# Patient Record
Sex: MALE | Race: WHITE | NOT HISPANIC OR LATINO | URBAN - METROPOLITAN AREA
[De-identification: names, ages, dates, MRNs, and addresses within clinical notes are randomized per-mention and may not be internally consistent; named-entity substitution may affect disease eponyms.]

---

## 2019-10-11 ENCOUNTER — EMERGENCY (EMERGENCY)
Facility: HOSPITAL | Age: 44
LOS: 1 days | End: 2019-10-11
Admitting: EMERGENCY MEDICINE
Payer: COMMERCIAL

## 2019-10-11 ENCOUNTER — INPATIENT (INPATIENT)
Facility: HOSPITAL | Age: 44
LOS: 2 days | Discharge: HOME | DRG: 101 | End: 2019-10-14
Attending: HOSPITALIST | Admitting: INTERNAL MEDICINE
Payer: COMMERCIAL

## 2019-10-11 VITALS
SYSTOLIC BLOOD PRESSURE: 112 MMHG | OXYGEN SATURATION: 98 % | HEART RATE: 80 BPM | RESPIRATION RATE: 20 BRPM | TEMPERATURE: 98 F | DIASTOLIC BLOOD PRESSURE: 76 MMHG

## 2019-10-11 DIAGNOSIS — G40.901 EPILEPSY, UNSPECIFIED, NOT INTRACTABLE, WITH STATUS EPILEPTICUS: ICD-10-CM

## 2019-10-11 LAB
ALBUMIN SERPL ELPH-MCNC: 3.8 G/DL — SIGNIFICANT CHANGE UP (ref 3.3–5.2)
ALP SERPL-CCNC: 80 U/L — SIGNIFICANT CHANGE UP (ref 40–120)
ALT FLD-CCNC: 33 U/L — SIGNIFICANT CHANGE UP
AMPHET UR-MCNC: NEGATIVE — SIGNIFICANT CHANGE UP
ANION GAP SERPL CALC-SCNC: 13 MMOL/L — SIGNIFICANT CHANGE UP (ref 5–17)
APPEARANCE UR: CLEAR — SIGNIFICANT CHANGE UP
APTT BLD: 49 SEC — HIGH (ref 27.5–36.3)
AST SERPL-CCNC: 24 U/L — SIGNIFICANT CHANGE UP
BACTERIA # UR AUTO: ABNORMAL
BARBITURATES UR SCN-MCNC: NEGATIVE — SIGNIFICANT CHANGE UP
BASOPHILS # BLD AUTO: 0.03 K/UL — SIGNIFICANT CHANGE UP (ref 0–0.2)
BASOPHILS NFR BLD AUTO: 0.3 % — SIGNIFICANT CHANGE UP (ref 0–2)
BENZODIAZ UR-MCNC: NEGATIVE — SIGNIFICANT CHANGE UP
BILIRUB SERPL-MCNC: 0.2 MG/DL — LOW (ref 0.4–2)
BILIRUB UR-MCNC: NEGATIVE — SIGNIFICANT CHANGE UP
BLD GP AB SCN SERPL QL: SIGNIFICANT CHANGE UP
BUN SERPL-MCNC: 11 MG/DL — SIGNIFICANT CHANGE UP (ref 8–20)
CALCIUM SERPL-MCNC: 8.6 MG/DL — SIGNIFICANT CHANGE UP (ref 8.6–10.2)
CHLORIDE SERPL-SCNC: 106 MMOL/L — SIGNIFICANT CHANGE UP (ref 98–107)
CO2 SERPL-SCNC: 20 MMOL/L — LOW (ref 22–29)
COCAINE METAB.OTHER UR-MCNC: NEGATIVE — SIGNIFICANT CHANGE UP
COLOR SPEC: YELLOW — SIGNIFICANT CHANGE UP
COMMENT - URINE: SIGNIFICANT CHANGE UP
CREAT SERPL-MCNC: 0.67 MG/DL — SIGNIFICANT CHANGE UP (ref 0.5–1.3)
DIFF PNL FLD: NEGATIVE — SIGNIFICANT CHANGE UP
EOSINOPHIL # BLD AUTO: 0.11 K/UL — SIGNIFICANT CHANGE UP (ref 0–0.5)
EOSINOPHIL NFR BLD AUTO: 1 % — SIGNIFICANT CHANGE UP (ref 0–6)
EPI CELLS # UR: SIGNIFICANT CHANGE UP
GLUCOSE SERPL-MCNC: 122 MG/DL — HIGH (ref 70–115)
GLUCOSE UR QL: NEGATIVE MG/DL — SIGNIFICANT CHANGE UP
HCT VFR BLD CALC: 46 % — SIGNIFICANT CHANGE UP (ref 39–50)
HGB BLD-MCNC: 15.2 G/DL — SIGNIFICANT CHANGE UP (ref 13–17)
IMM GRANULOCYTES NFR BLD AUTO: 0.4 % — SIGNIFICANT CHANGE UP (ref 0–1.5)
INR BLD: 3.18 RATIO — HIGH (ref 0.88–1.16)
KETONES UR-MCNC: NEGATIVE — SIGNIFICANT CHANGE UP
LEUKOCYTE ESTERASE UR-ACNC: NEGATIVE — SIGNIFICANT CHANGE UP
LYMPHOCYTES # BLD AUTO: 1.61 K/UL — SIGNIFICANT CHANGE UP (ref 1–3.3)
LYMPHOCYTES # BLD AUTO: 14.6 % — SIGNIFICANT CHANGE UP (ref 13–44)
MCHC RBC-ENTMCNC: 29.8 PG — SIGNIFICANT CHANGE UP (ref 27–34)
MCHC RBC-ENTMCNC: 33 GM/DL — SIGNIFICANT CHANGE UP (ref 32–36)
MCV RBC AUTO: 90.2 FL — SIGNIFICANT CHANGE UP (ref 80–100)
METHADONE UR-MCNC: NEGATIVE — SIGNIFICANT CHANGE UP
MONOCYTES # BLD AUTO: 0.63 K/UL — SIGNIFICANT CHANGE UP (ref 0–0.9)
MONOCYTES NFR BLD AUTO: 5.7 % — SIGNIFICANT CHANGE UP (ref 2–14)
NEUTROPHILS # BLD AUTO: 8.59 K/UL — HIGH (ref 1.8–7.4)
NEUTROPHILS NFR BLD AUTO: 78 % — HIGH (ref 43–77)
NITRITE UR-MCNC: NEGATIVE — SIGNIFICANT CHANGE UP
OPIATES UR-MCNC: NEGATIVE — SIGNIFICANT CHANGE UP
PCP SPEC-MCNC: SIGNIFICANT CHANGE UP
PCP UR-MCNC: NEGATIVE — SIGNIFICANT CHANGE UP
PH UR: 5 — SIGNIFICANT CHANGE UP (ref 5–8)
PLATELET # BLD AUTO: 227 K/UL — SIGNIFICANT CHANGE UP (ref 150–400)
POTASSIUM SERPL-MCNC: 4.4 MMOL/L — SIGNIFICANT CHANGE UP (ref 3.5–5.3)
POTASSIUM SERPL-SCNC: 4.4 MMOL/L — SIGNIFICANT CHANGE UP (ref 3.5–5.3)
PROT SERPL-MCNC: 6.5 G/DL — LOW (ref 6.6–8.7)
PROT UR-MCNC: 15 MG/DL
PROTHROM AB SERPL-ACNC: 37.9 SEC — HIGH (ref 10–12.9)
RBC # BLD: 5.1 M/UL — SIGNIFICANT CHANGE UP (ref 4.2–5.8)
RBC # FLD: 13.2 % — SIGNIFICANT CHANGE UP (ref 10.3–14.5)
RBC CASTS # UR COMP ASSIST: SIGNIFICANT CHANGE UP /HPF (ref 0–4)
SODIUM SERPL-SCNC: 139 MMOL/L — SIGNIFICANT CHANGE UP (ref 135–145)
SP GR SPEC: 1.02 — SIGNIFICANT CHANGE UP (ref 1.01–1.02)
THC UR QL: NEGATIVE — SIGNIFICANT CHANGE UP
UROBILINOGEN FLD QL: NEGATIVE MG/DL — SIGNIFICANT CHANGE UP
WBC # BLD: 11.01 K/UL — HIGH (ref 3.8–10.5)
WBC # FLD AUTO: 11.01 K/UL — HIGH (ref 3.8–10.5)
WBC UR QL: SIGNIFICANT CHANGE UP

## 2019-10-11 PROCEDURE — 72125 CT NECK SPINE W/O DYE: CPT | Mod: 26

## 2019-10-11 PROCEDURE — 99291 CRITICAL CARE FIRST HOUR: CPT

## 2019-10-11 PROCEDURE — 99053 MED SERV 10PM-8AM 24 HR FAC: CPT

## 2019-10-11 PROCEDURE — 99285 EMERGENCY DEPT VISIT HI MDM: CPT

## 2019-10-11 PROCEDURE — 70450 CT HEAD/BRAIN W/O DYE: CPT | Mod: 26,77

## 2019-10-11 PROCEDURE — 99222 1ST HOSP IP/OBS MODERATE 55: CPT

## 2019-10-11 PROCEDURE — 71045 X-RAY EXAM CHEST 1 VIEW: CPT | Mod: 26

## 2019-10-11 PROCEDURE — 70450 CT HEAD/BRAIN W/O DYE: CPT | Mod: 26

## 2019-10-11 PROCEDURE — 99223 1ST HOSP IP/OBS HIGH 75: CPT

## 2019-10-11 RX ORDER — LEVETIRACETAM 250 MG/1
750 TABLET, FILM COATED ORAL EVERY 12 HOURS
Refills: 0 | Status: DISCONTINUED | OUTPATIENT
Start: 2019-10-11 | End: 2019-10-13

## 2019-10-11 RX ORDER — ONDANSETRON 8 MG/1
4 TABLET, FILM COATED ORAL ONCE
Refills: 0 | Status: COMPLETED | OUTPATIENT
Start: 2019-10-11 | End: 2019-10-11

## 2019-10-11 RX ORDER — LEVETIRACETAM 250 MG/1
1000 TABLET, FILM COATED ORAL ONCE
Refills: 0 | Status: COMPLETED | OUTPATIENT
Start: 2019-10-11 | End: 2019-10-11

## 2019-10-11 RX ORDER — ENOXAPARIN SODIUM 100 MG/ML
40 INJECTION SUBCUTANEOUS DAILY
Refills: 0 | Status: DISCONTINUED | OUTPATIENT
Start: 2019-10-11 | End: 2019-10-14

## 2019-10-11 RX ORDER — MIDAZOLAM HYDROCHLORIDE 1 MG/ML
4 INJECTION, SOLUTION INTRAMUSCULAR; INTRAVENOUS ONCE
Refills: 0 | Status: DISCONTINUED | OUTPATIENT
Start: 2019-10-11 | End: 2019-10-11

## 2019-10-11 RX ADMIN — ONDANSETRON 4 MILLIGRAM(S): 8 TABLET, FILM COATED ORAL at 14:37

## 2019-10-11 RX ADMIN — Medication 2 MILLIGRAM(S): at 14:24

## 2019-10-11 RX ADMIN — LEVETIRACETAM 400 MILLIGRAM(S): 250 TABLET, FILM COATED ORAL at 14:40

## 2019-10-11 RX ADMIN — MIDAZOLAM HYDROCHLORIDE 4 MILLIGRAM(S): 1 INJECTION, SOLUTION INTRAMUSCULAR; INTRAVENOUS at 14:32

## 2019-10-11 NOTE — ED PROVIDER NOTE - OBJECTIVE STATEMENT
44yoM; with pmh signif for Seizures (on Trileptal), ?TBI (s/p craniectomy at age 8); now presenting as [place] [facility]. patient had seizure at home, transferred [place] [facility] for possible SDH seen on CT.  while in ED, patient had other seizure, required ativan and keppra loaded, seen by Neuro.  +tongue bite, +urinary incontinence.   PMH: Seizures, TBI(s/p craniectomy)  SOCIAL: No tobacco/illicit substance use/EtOH 44yoM; with pmh signif for Factor 5 Leiden (on Coumadin), Seizures (on Trileptal), ?TBI (s/p craniectomy at age 8); now presenting as [facility]. patient had seizure at home, transferred [facility] for possible SDH seen on CT.  while in ED, patient had other seizure, required ativan and keppra loaded, seen by Neuro.  +tongue bite, +urinary incontinence.   PMH: Seizures, TBI(s/p craniectomy)  SOCIAL: No tobacco/illicit substance use/EtOH

## 2019-10-11 NOTE — ED ADULT TRIAGE NOTE - CHIEF COMPLAINT QUOTE
Pt BIBA from [facility] for further evaluation of possible Subdural Bleed post seizure. Pt has hx of seizures. Pt on 3L NC and received trileptal for seizures at [facility].

## 2019-10-11 NOTE — ED PROVIDER NOTE - CRITICAL CARE PROVIDED
direct patient care (not related to procedure)/additional history taking/interpretation of diagnostic studies/consultation with other physicians

## 2019-10-11 NOTE — ED ADULT NURSE REASSESSMENT NOTE - NS ED NURSE REASSESS COMMENT FT1
report rec'd fromEMS  pt transferred from [facility],  apparent sz while waking up this AM  "I changed my sleep pattern, I stayed up too late last night;"   "I bit my tongue"  pt with dried blood to mouth and hands.   IVSL 20g LAC and RAC  with 2l NS infusing to RAC  site  clean.dry   CM NSR 80s  pt awake alert and oriented able to makes needs known.     requesting urinal,   dr Fuentes at bedside,.

## 2019-10-11 NOTE — ED ADULT NURSE NOTE - CHIEF COMPLAINT QUOTE
Pt BIBA from [facility] for further evaluation of possible Subdural Bleed post seizure. Pt has hx of seizures. Pt on 3L NC and received trileptal for seizures at [facility].

## 2019-10-11 NOTE — CONSULT NOTE ADULT - SUBJECTIVE AND OBJECTIVE BOX
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 Patient states his son found him this AM covered in blood and called EMS. Patient without complaints at time of assessment, just states he feels "uncomfortable." States he had transient LLE numbness earlier that has since resolved. 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 at age 8 in [place]  Seizures, on Trileptal 600 BID followed by neurologist in [place]    SOCIAL HISTORY:  Works for [employer] on [place], lives in [place]    Allergies  Dilantin (Anaphylaxis; Hives)  valproic acid (Anaphylaxis; Hives)    REVIEW OF SYSTEMS  Negative except as noted in HPI    HOME MEDICATIONS:  Home Medications:    MEDICATIONS:  Antibiotics:    Neuro:  levETIRAcetam  IVPB 750 milliGRAM(s) IV Intermittent every 12 hours    Anticoagulation:    OTHER:    IVF:    Vital Signs Last 24 Hrs  T(C): 36.6 (11 Oct 2019 10:53), Max: 36.6 (11 Oct 2019 10:53)  T(F): 97.9 (11 Oct 2019 10:53), Max: 97.9 (11 Oct 2019 10:53)  HR: 120 (11 Oct 2019 14:35) (80 - 120)  BP: 166/71 (11 Oct 2019 14:35) (112/76 - 168/72)  BP(mean): --  RR: 26 (11 Oct 2019 14:35) (20 - 26)  SpO2: 98% (11 Oct 2019 14:35) (98% - 98%)    PHYSICAL EXAM:  GENERAL: NAD, disheveled, noted to have dirty fingernails and toenails  HEAD: Atraumatic, normocephalic  EYES: Conjunctiva and sclera clear  NECK: Supple  KAREN COMA SCORE: E- 4 V- 5 M- 6=15  MENTAL STATUS: AAO x3; Appropriately conversant without aphasia; following commands  CRANIAL NERVES: PERRL. EOMI without nystagmus. Facial sensation intact V1-3 distribution b/l. Face symmetric w/ normal eye closure and smile, tongue midline. Hearing grossly intact. Dysarthric- likely 2/2 edema from biting of tongue  MOTOR: strength 5/5 b/l upper and lower extremities  SENSATION: grossly intact to light touch all extremities  CHEST/LUNG: Nonlabored breathing, no respiratory distress    LABS:                        15.2   11.01 )-----------( 227      ( 11 Oct 2019 11:37 )             46.0     10-11    139  |  106  |  11.0  ----------------------------<  122<H>  4.4   |  20.0<L>  |  0.67    Ca    8.6      11 Oct 2019 11:37    TPro  6.5<L>  /  Alb  3.8  /  TBili  0.2<L>  /  DBili  x   /  AST  24  /  ALT  33  /  AlkPhos  80  10-11    PT/INR - ( 11 Oct 2019 11:37 )   PT: 37.9 sec;   INR: 3.18 ratio       PTT - ( 11 Oct 2019 11:37 )  PTT:49.0 sec    RADIOLOGY & ADDITIONAL STUDIES:  CT HEAD [facility] 10/11/19 08:13  Status post right frontal craniotomy. [place]-shaped hyperattenuation at the right frontal region is seen adjacent to the right frontal craniotomy plate and screws, measuring up to 8 mm in thickness, and without prior studies, it is unclear if this represents right frontal subdural versus artifact from craniotomy hardware. Encephalomalacia and volume loss is seen at the right frontal lobe, presumably from prior infarct or other insult.    CT HEAD C SPINE 10/11/19:  No CT evidence for acute fracture or subluxation    CT Head No Cont (10.11.19 @ 13:55)  IMPRESSION:  Right frontal lobe encephalomalacia. No evidence of an acute   transcortical infarction or hemorrhage. MR is a more sensitive imaging   modality for the evaluation of an acute infarction.

## 2019-10-11 NOTE — CONSULT NOTE ADULT - SUBJECTIVE AND OBJECTIVE BOX
[facility]                                        Neurology at [place]                                  Mookie Nunes, & Isidro                                      [address]                                           [place], [place], [address]                                                [phone]        CC: Seizure.     HISTORY:  The patient is a 44y Male who presented to [facility] after a seizure.   He has a history of some type of brain injury with surgery and a metal plate in the right frontotemporal region.   He has a history of seizure in the past but had reportedly been seizure free for about 8 years.   He apparently admitted to changing sleep patterns as his only risk factor.   He is reportedly on Trileptal although the dose is unclear.   At [facility] there was concern for subdural hematoma and he was transferred here.     He is currently "post ictal" and cannot provide any information.     PAST MEDICAL & SURGICAL HISTORY:  Unknown.     MEDICATION PRIOR TO ADMISSION:  Trileptal.     MEDICATIONS  (STANDING):  levETIRAcetam  IVPB 1000 milliGRAM(s) IV Intermittent once  midazolam Injectable 4 milliGRAM(s) IV Push Once  ondansetron Injectable 4 milliGRAM(s) IV Push Once    Allergies  Dilantin (Anaphylaxis; Hives)  valproic acid (Anaphylaxis; Hives)    SOCIAL HISTORY:  Unknown as patient unable to provide due to condition.    FAMILY HISTORY:  Unknown as patient unable to provide due to condition.    ROS:  Constitutional: Unobtainable due to patient's condition.   Neuro: Unobtainable due to patient's condition.   Eyes: Unobtainable due to patient's condition.   Ears/nose/throat: Unobtainable due to patient's condition.   Cardiac: Unobtainable due to patient's condition.   Respiratory: Unobtainable due to patient's condition.   GI: Unobtainable due to patient's condition.   : Unobtainable due to patient's condition..  Integumentary: Unobtainable due to patient's condition.  Psych: Unobtainable due to patient's condition.  Heme: Unobtainable due to patient's condition.     Exam:  Vital Signs Last 24 Hrs  T(C): 36.6 (11 Oct 2019 10:53), Max: 36.6 (11 Oct 2019 10:53)  T(F): 97.9 (11 Oct 2019 10:53), Max: 97.9 (11 Oct 2019 10:53)  HR: 120 (11 Oct 2019 14:35) (80 - 120)  BP: 166/71 (11 Oct 2019 14:35) (112/76 - 168/72)  RR: 26 (11 Oct 2019 14:35) (20 - 26)  SpO2: 98% (11 Oct 2019 14:35) (98% - 98%)  General: NAD.   Carotid bruits absent.     Mental status: Lethargic. Opens eyes to voice/stimuli. Not following instructions. Non verbal.     Cranial nerves: There is no papilledema. Pupils react symmetrically to light. He blinks to threat bilaterally. He does not track with gaze. Corneal reflexes are intact. Facial musculature is symmetric. Palate and tongue cannot be assessed.     Motor/Sensory: There is normal bulk and tone.  There is minimal symmetric limb movement to stimuli.    Reflexes: 1+ throughout and plantar responses are flexor.    Cerebellar: Cannot be tested.     LABS:                         15.2   11.01 )-----------( 227      ( 11 Oct 2019 11:37 )             46.0       10-11    139  |  106  |  11.0  ----------------------------<  122<H>  4.4   |  20.0<L>  |  0.67    Ca    8.6      11 Oct 2019 11:37    TPro  6.5<L>  /  Alb  3.8  /  TBili  0.2<L>  /  DBili  x   /  AST  24  /  ALT  33  /  AlkPhos  80  10-11      PT/INR - ( 11 Oct 2019 11:37 )   PT: 37.9 sec;   INR: 3.18 ratio    PTT - ( 11 Oct 2019 11:37 )  PTT:49.0 sec    RADIOLOGY   CT head images reviewed (and concur with report): There is no acute pathology.   There is right frontal encephalomalacia and some questionable laminar necrosis. There is right frontal-temporal cranioplasty.   There is no subdural hematoma.

## 2019-10-11 NOTE — ED ADULT NURSE NOTE - OBJECTIVE STATEMENT
[facility] alert and [facility], respirations even unlabored. pt states he was sleeping, had a seizure, bit his tongue and hit his head. pt educated on plan of care, pt able to successfully teach back plan of care to RN, RN will continue to reeducate pt during hospital stay.

## 2019-10-11 NOTE — ED ADULT NURSE NOTE - NSIMPLEMENTINTERV_GEN_ALL_ED
Implemented All Fall Risk Interventions:  [place] to call system. Call bell, personal items and telephone within reach. Instruct patient to call for assistance. Room bathroom lighting operational. Non-slip footwear when patient is off stretcher. Physically safe environment: no spills, clutter or unnecessary equipment. Stretcher in lowest position, wheels locked, appropriate side rails in place. Provide visual cue, wrist band, yellow gown, etc. Monitor gait and stability. Monitor for mental status changes and reorient to person, place, and time. Review medications for side effects contributing to fall risk. Reinforce activity limits and safety measures with patient and family.

## 2019-10-11 NOTE — ED PROVIDER NOTE - NS ED ROS FT
Constitutional: (-) fever  (-)chills  (-)sweats  Eyes/ENT: (-) blurry vision, (-) epistaxis  (-)rhinorrhea   (-) sore throat    Cardiovascular: (-) chest pain, (-) palpitations (-) edema   Respiratory: (-) cough, (-) shortness of breath   Gastrointestinal: (-)nausea  (-)vomiting, (-) diarrhea  (-) abdominal pain   :  (-)dysuria, (-)frequency, (-)urgency, (-)hematuria  Musculoskeletal: (-) neck pain, (-) back pain, (-) joint pain  Integumentary: (-) rash, (-) edema  Neurological: (-) headache, (-) altered mental status  (+)LOC  +seizure

## 2019-10-11 NOTE — ED PROVIDER NOTE - PHYSICAL EXAMINATION
General:    post-ictal  Head:     NC/AT, EOMI, +tongue bite  Neck:     trachea midline  Lungs:     CTA b/l, no w/r/r  CVS:     S1S2, RRR, no m/g/r  Abd:     +BS, s/nt/nd, no organomegaly  Ext:    2+ radial and pedal pulses, no c/c/e  Neuro: AAOx3, no sensory/motor deficits

## 2019-10-11 NOTE — CONSULT NOTE ADULT - SUBJECTIVE AND OBJECTIVE BOX
Patient is a 44y old  Male who presents with a chief complaint of     BRIEF HOSPITAL COURSE: ***    Events last 24 hours: ***    PAST MEDICAL & SURGICAL HISTORY:      Review of Systems:  CONSTITUTIONAL: No fever, chills, or fatigue  EYES: No eye pain, visual disturbances, or discharge  ENMT:  No difficulty hearing, tinnitus, vertigo; No sinus or throat pain  NECK: No pain or stiffness  RESPIRATORY: No cough, wheezing, chills or hemoptysis; No shortness of breath  CARDIOVASCULAR: No chest pain, palpitations, dizziness, or leg swelling  GASTROINTESTINAL: No abdominal or epigastric pain. No nausea, vomiting, or hematemesis; No diarrhea or constipation. No melena or hematochezia.  GENITOURINARY: No dysuria, frequency, hematuria, or incontinence  NEUROLOGICAL: No headaches, memory loss, loss of strength, numbness, or tremors  SKIN: No itching, burning, rashes, or lesions   MUSCULOSKELETAL: No joint pain or swelling; No muscle, back, or extremity pain  PSYCHIATRIC: No depression, anxiety, mood swings, or difficulty sleeping      Medications:        levETIRAcetam  IVPB 750 milliGRAM(s) IV Intermittent every 12 hours                            ICU Vital Signs Last 24 Hrs  T(C): 36.6 (11 Oct 2019 10:53), Max: 36.6 (11 Oct 2019 10:53)  T(F): 97.9 (11 Oct 2019 10:53), Max: 97.9 (11 Oct 2019 10:53)  HR: 88 (11 Oct 2019 15:35) (80 - 120)  BP: 135/62 (11 Oct 2019 15:35) (112/76 - 168/72)  BP(mean): --  ABP: --  ABP(mean): --  RR: 20 (11 Oct 2019 15:35) (20 - 26)  SpO2: 100% (11 Oct 2019 15:35) (98% - 100%)          I&O's Detail        LABS:                        15.2   11.01 )-----------( 227      ( 11 Oct 2019 11:37 )             46.0     10-11    139  |  106  |  11.0  ----------------------------<  122<H>  4.4   |  20.0<L>  |  0.67    Ca    8.6      11 Oct 2019 11:37    TPro  6.5<L>  /  Alb  3.8  /  TBili  0.2<L>  /  DBili  x   /  AST  24  /  ALT  33  /  AlkPhos  80  10-11          CAPILLARY BLOOD GLUCOSE        PT/INR - ( 11 Oct 2019 11:37 )   PT: 37.9 sec;   INR: 3.18 ratio         PTT - ( 11 Oct 2019 11:37 )  PTT:49.0 sec    CULTURES:      Physical Examination:    General: No acute distress.  Alert, oriented, interactive, nonfocal    HEENT: Pupils equal, reactive to light.  Symmetric.    PULM: Clear to auscultation bilaterally, no significant sputum production    CVS: Regular rate and rhythm, no murmurs, rubs, or gallops    ABD: Soft, nondistended, nontender, normoactive bowel sounds, no masses    EXT: No edema, nontender    SKIN: Warm and well perfused, no rashes noted.    NEURO: A&Ox3, strength 5/5 all extremities, cranial nerves grossly intact, no focal deficits      RADIOLOGY: ***      CRITICAL CARE TIME SPENT: ***  Evaluating/treating patient, reviewing data/labs/imaging, discussing case with multidisciplinary team, discussing plan/goals of care with patient/family. Non-inclusive of procedure time. Patient is a 44y old  Male who presents with a chief complaint of Seizure     HPI: 44yoM; with pmh signif for Factor 5 Leiden (on Coumadin), Seizures (on Trileptal), ?TBI (s/p craniectomy at age 8) presents to [facility] for seizures for which he got his home dose of trileptal for. They obtained a CT head at [facility] which suggested a SDH and he was transferred to [facility] for evaluation. Repeat CT head at [facility] without evidence of SDH, most likely motion artifact. While in ER at [facility] has seizure and got 2mg IV ativan and 4mg Versed, loaded with 1g of keppra. During seizure he did bite his tongue. Significant amount of bleeding during the episode which required 10min of suctioning. Patient was seen and exaine    PAST MEDICAL & SURGICAL HISTORY:      Review of Systems:  CONSTITUTIONAL: No fever, chills, or fatigue  EYES: No eye pain, visual disturbances, or discharge  ENMT:  No difficulty hearing, tinnitus, vertigo; No sinus or throat pain  NECK: No pain or stiffness  RESPIRATORY: No cough, wheezing, chills or hemoptysis; No shortness of breath  CARDIOVASCULAR: No chest pain, palpitations, dizziness, or leg swelling  GASTROINTESTINAL: No abdominal or epigastric pain. No nausea, vomiting, or hematemesis; No diarrhea or constipation. No melena or hematochezia.  GENITOURINARY: No dysuria, frequency, hematuria, or incontinence  NEUROLOGICAL: No headaches, memory loss, loss of strength, numbness, or tremors  SKIN: No itching, burning, rashes, or lesions   MUSCULOSKELETAL: No joint pain or swelling; No muscle, back, or extremity pain  PSYCHIATRIC: No depression, anxiety, mood swings, or difficulty sleeping      Medications:        levETIRAcetam  IVPB 750 milliGRAM(s) IV Intermittent every 12 hours                            ICU Vital Signs Last 24 Hrs  T(C): 36.6 (11 Oct 2019 10:53), Max: 36.6 (11 Oct 2019 10:53)  T(F): 97.9 (11 Oct 2019 10:53), Max: 97.9 (11 Oct 2019 10:53)  HR: 88 (11 Oct 2019 15:35) (80 - 120)  BP: 135/62 (11 Oct 2019 15:35) (112/76 - 168/72)  BP(mean): --  ABP: --  ABP(mean): --  RR: 20 (11 Oct 2019 15:35) (20 - 26)  SpO2: 100% (11 Oct 2019 15:35) (98% - 100%)          I&O's Detail        LABS:                        15.2   11.01 )-----------( 227      ( 11 Oct 2019 11:37 )             46.0     10-11    139  |  106  |  11.0  ----------------------------<  122<H>  4.4   |  20.0<L>  |  0.67    Ca    8.6      11 Oct 2019 11:37    TPro  6.5<L>  /  Alb  3.8  /  TBili  0.2<L>  /  DBili  x   /  AST  24  /  ALT  33  /  AlkPhos  80  10-11          CAPILLARY BLOOD GLUCOSE        PT/INR - ( 11 Oct 2019 11:37 )   PT: 37.9 sec;   INR: 3.18 ratio         PTT - ( 11 Oct 2019 11:37 )  PTT:49.0 sec    CULTURES:      Physical Examination:    General: No acute distress.  Alert, oriented, interactive, nonfocal    HEENT: Pupils equal, reactive to light.  Symmetric.    PULM: Clear to auscultation bilaterally, no significant sputum production    CVS: Regular rate and rhythm, no murmurs, rubs, or gallops    ABD: Soft, nondistended, nontender, normoactive bowel sounds, no masses    EXT: No edema, nontender    SKIN: Warm and well perfused, no rashes noted.    NEURO: A&Ox3, strength 5/5 all extremities, cranial nerves grossly intact, no focal deficits      RADIOLOGY: ***      CRITICAL CARE TIME SPENT: ***  Evaluating/treating patient, reviewing data/labs/imaging, discussing case with multidisciplinary team, discussing plan/goals of care with patient/family. Non-inclusive of procedure time. 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 They obtained a CT head at [facility] which suggested a SDH and he was transferred to [facility] for evaluation. Repeat CT head at [facility] without evidence of SDH, most likely motion artifact. While in ER at [facility] has seizure and got 2mg IV ativan and 4mg Versed, loaded with 1g of keppra. During seizure he did bite his tongue. Significant amount of bleeding during the episode which required 10min of suctioning. Patient was seen and examined at the bedside. He is slightly lethargic from the benzos, however easily able to be aroused with verbal stimuli. He remains on nasal canula 3L. Further history is difficult to obtain due to medication affect.     PAST MEDICAL & SURGICAL HISTORY:      Review of Systems:  unable to obtain due to clinical status     Medications:      levETIRAcetam  IVPB 750 milliGRAM(s) IV Intermittent every 12 hours      ICU Vital Signs   T(C): 36.6   T(F): 97.9   HR: 88  BP: 135/62  RR: 20   SpO2: 100%      I&O's Detail        LABS:                        15.2   11.01 )-----------( 227      ( 11 Oct 2019 11:37 )             46.0     10-11    139  |  106  |  11.0  ----------------------------<  122<H>  4.4   |  20.0<L>  |  0.67    Ca    8.6      11 Oct 2019 11:37    TPro  6.5<L>  /  Alb  3.8  /  TBili  0.2<L>  /  DBili  x   /  AST  24  /  ALT  33  /  AlkPhos  80  10-11      CAPILLARY BLOOD GLUCOSE        PT/INR - ( 11 Oct 2019 11:37 )   PT: 37.9 sec;   INR: 3.18 ratio         PTT - ( 11 Oct 2019 11:37 )  PTT:49.0 sec    CULTURES:      Physical Examination:    General: Large man, lying in the stretcher sleeping,     HEENT: Pupils equal, reactive to light.  Symmetric. Visible blood in mouth, visible tongue injury.     PULM: Clear to auscultation bilaterally, no significant sputum production    CVS: Regular rate and rhythm, no murmurs, rubs, or gallops    ABD: Soft, nondistended, nontender, normoactive bowel sounds, no masses    EXT: No edema, nontender    SKIN: Warm and well perfused, no rashes noted.    NEURO: Alert, able to be aroused by verbal stimuli, moving all four extremities      RADIOLOGY: EXAM:  CT BRAIN                          PROCEDURE DATE:  10/11/2019          INTERPRETATION:  CLINICAL Indications:  seizure, ?bleed    COMPARISON: None.    TECHNIQUE: Noncontrast CT of the head. Multiplanar reformations are   submitted.    FINDINGS:   Status post right frontal temporal cranioplasty. Artifact from the   hardware limits interpretation. Moderate encephalomalacia in the right   frontal lobe. Ex vacuo dilation of right frontal horn.  There is no compelling evidence for an acute transcortical infarction.   There is no evidence of mass, mass effect, midline shift or extra-axial   fluid collection. The lateral ventricles and cortical sulci are otherwise   age-appropriate in size and configuration. The orbits, mastoid air cells   and visualized paranasal sinuses are normal. The calvarium is otherwise   intact.    IMPRESSION:  Right frontal lobe encephalomalacia. No evidence of an acute   transcortical infarction or hemorrhage. MR is a more sensitive imaging   modality for theevaluation of an acute infarction.     MATI RUIZ M.D., ATTENDING RADIOLOGIST  This document has been electronically signed. Oct 11 2019  2:22PM    TIME SPENT: 35 MIN   Evaluating/treating pAtient, reviewing data/labs/imaging, discussing case with multidisciplinary team, discussing plan/goals of care with patient/family. Non-inclusive of procedure time.

## 2019-10-11 NOTE — H&P ADULT - NSICDXPASTMEDICALHX_GEN_ALL_CORE_FT
PAST MEDICAL HISTORY:  Factor 5 Leiden mutation, heterozygous     S/P craniotomy     Seizure     TBI (traumatic brain injury)

## 2019-10-11 NOTE — H&P ADULT - NSHPPHYSICALEXAM_GEN_ALL_CORE
Vital Signs Last 24 Hrs  T(C): 36.6 (10-11-19 @ 10:53), Max: 36.6 (10-11-19 @ 10:53)  T(F): 97.9 (10-11-19 @ 10:53), Max: 97.9 (10-11-19 @ 10:53)  HR: 88 (10-11-19 @ 15:35) (80 - 120)  BP: 135/62 (10-11-19 @ 15:35) (112/76 - 168/72)  BP(mean): --  RR: 20 (10-11-19 @ 15:35) (20 - 26)  SpO2: 100% (10-11-19 @ 15:35) (98% - 100%)  GENERAL: NAD, disheveled, noted to have dirty fingernails and toenails  HEAD: Atraumatic, normocephalic  EYES: Conjunctiva and sclera clear  NECK: Supple  KAREN COMA SCORE: E- 4 V- 5 M- 6=15  MENTAL STATUS: AAOx3; Appropriately conversant without aphasia; following commands  CRANIAL NERVES: PERRL. EOMI without nystagmus. Facial sensation intact V1-3 distribution b/l. Face symmetric w/ normal eye closure and smile, tongue midline. Hearing grossly intact. Dysarthric- likely 2/2 edema from biting of tongue  MOTOR: strength 5/5 b/l upper and lower extremities  SENSATION: grossly intact to light touch all extremities  CHEST/LUNG: Nonlabored breathing, no respiratory distress

## 2019-10-11 NOTE — CONSULT NOTE ADULT - ASSESSMENT
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  - CT head at [facility] without evidence of hemorrhage  - Per ED, had another seizure episode after CT head    PLAN:  - D/w Dr. Dan  - No hemorrhage/acute intracranial pathology  - No acute neurosurgical recommendations at this time  - Neurology following- on Keppra now, planning to resume Trileptal once awake  - Further care per primary team  - Reconsult PRN
The patient is a 44y Male with breakthrough seizure.  He has known structural brain pathology and seizure disorder.     Seizure  Agree with maintaining on Keppra.   Patient received 1000 mg in ER.   Will continue 750 mg BID.   Change to PO once awake.   When awake can resume Trileptal. (There is no IV equivalent).    Respiratory status.   If no improvement may need ICU evaluation.     Case discussed with ER attending Dr Rendon.
4yoM; with pmh signif for Factor 5 Leiden (on Coumadin), Seizures (on Trileptal), ?TBI (s/p craniectomy at age 8) presents to [facility] for seizures for which he got his home dose of trileptal for. They obtained a CT head at [facility] which suggested a SDH and he was transferred to [facility] for evaluation. Repeat CT head at [facility] without evidence of SDH, most likely motion artifact. While in ER at [facility] has seizure and got 2mg IV ativan and 4mg Versed, loaded with 1g of keppra. During seizure he did bite his tongue. Patient is mildly lethargic from ativan and versed, but easily able to be aroused. At this time patient does not require admission to the ICU. Would recommend 4 BRK and end tidal CO2 monitoring via nasal canula. If patient's status changes please reconsult.     Plan discussed with ICU attending Dr. Prajapati     Problem List:   1) Seizures    - Recommend continuing keppra 750mg BID per neurology and adding back his trileptal 600mg BID once able to take PO  - CT head without SDH  - Would avoid further benzos or sedating medications unless patient with recurrent seizure  - Would recommend 4BRK admission with end tidal CO2 monitoring   - Patient easily able to be aroused and at this time protecting airway

## 2019-10-11 NOTE — ED ADULT NURSE REASSESSMENT NOTE - NS ED NURSE REASSESS COMMENT FT1
pt noted to be seizing, MD aND RN in room. pt seizure like activity, blood coming from patients mouth. pt given ativan 2 mg, versed 4 mg and zxofran 4 mg. pt then started on kepra IV. pt on non rebreather mask. placed in bilateral wrist restraints [id number] with MD at bedside.

## 2019-10-11 NOTE — H&P ADULT - ASSESSMENT
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 for revision 36 years ago in [place], seizures, Factor V Leiden on Coumadin    # Break through Seizures likely  Rpt CT head no e/o SDH  No inciting factor like infection noted  Compliance unclear  Loaded with Keppra  Cont Keppra and Trileptal  Appreciate Neuro/ NS input  Stepdown Unit  EEG- defer to Neuro  U tox Pending    # Factor V Leiden  Cont Coumadin when INR 2.5 or less, since no e/o bleed  Currently INR supra therapeutic  No need for reversal  Monitor    # Sec to tongue bite during Sz, will give liquid diet until tongue edema reduces    # Mild Leucocytosis  Likely reactive to Sz  trend temp and WBC    on coumadin 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 for revision 36 years ago in [place], seizures, Factor V Leiden on Coumadin    # Break through Seizures likely  Rpt CT head no e/o SDH  No inciting factor like infection noted  Compliance unclear  Loaded with Keppra  Cont Keppra and Trileptal (when dose known)  Appreciate Neuro/ NS input  Stepdown Unit  EEG- defer to Neuro  U tox Pending    # Factor V Leiden  Cont Coumadin when INR 2.5 or less, since no e/o bleed  Currently INR supra therapeutic  No need for reversal  Monitor    # Sec to tongue bite during Sz, will give liquid diet until tongue edema reduces    # Mild Leucocytosis  Likely reactive to Sz  trend temp and WBC    on coumadin    Home medications unknown. Spouse to bring home med. if not to check pharmacy in AM

## 2019-10-11 NOTE — H&P ADULT - HISTORY OF PRESENT ILLNESS
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 for revision 36 years ago in [place], seizures last known seizure 6 years ago on trileptal 600 mg BID followed by neurologist in [place], Factor V Leiden on Coumadin, transferred from [facility]. Patient states his son found him this AM covered in blood and called EMS. They obtained a CT head at [facility] which suggested a SDH and he was transferred to [facility] for evaluation.   Patient without complaints at time of assessment, just states he feels "uncomfortable." States he had transient LLE numbness earlier that has since resolved. States he wants to "get the show on the road so he can get out of here." Denies headache, dizziness, nausea, vomiting    Repeat CT head at [facility] without evidence of SDH, most likely motion artifact. While in ER at [facility] has seizure and got 2mg IV ativan and 4mg Versed, loaded with 1g of keppra. During seizure he did bite his tongue. Significant amount of bleeding during the episode which required 10min of suctioning.    He has a history of some type of brain injury with surgery and a metal plate in the right frontotemporal region.   He has a history of seizure in the past but had reportedly been seizure free for about 8 years.   He apparently admitted to changing sleep patterns as his only risk factor.     SH- works for a [employer] in [place], lives in [place], denies habits  FH- unknown per pt

## 2019-10-12 LAB
ANION GAP SERPL CALC-SCNC: 14 MMOL/L — SIGNIFICANT CHANGE UP (ref 5–17)
BUN SERPL-MCNC: 12 MG/DL — SIGNIFICANT CHANGE UP (ref 8–20)
CALCIUM SERPL-MCNC: 8.8 MG/DL — SIGNIFICANT CHANGE UP (ref 8.6–10.2)
CHLORIDE SERPL-SCNC: 109 MMOL/L — HIGH (ref 98–107)
CO2 SERPL-SCNC: 20 MMOL/L — LOW (ref 22–29)
CREAT SERPL-MCNC: 0.65 MG/DL — SIGNIFICANT CHANGE UP (ref 0.5–1.3)
GLUCOSE SERPL-MCNC: 116 MG/DL — HIGH (ref 70–115)
HCT VFR BLD CALC: 44.4 % — SIGNIFICANT CHANGE UP (ref 39–50)
HGB BLD-MCNC: 14.6 G/DL — SIGNIFICANT CHANGE UP (ref 13–17)
INR BLD: 2.68 RATIO — HIGH (ref 0.88–1.16)
MCHC RBC-ENTMCNC: 30.3 PG — SIGNIFICANT CHANGE UP (ref 27–34)
MCHC RBC-ENTMCNC: 32.9 GM/DL — SIGNIFICANT CHANGE UP (ref 32–36)
MCV RBC AUTO: 92.1 FL — SIGNIFICANT CHANGE UP (ref 80–100)
PLATELET # BLD AUTO: 230 K/UL — SIGNIFICANT CHANGE UP (ref 150–400)
POTASSIUM SERPL-MCNC: 4.3 MMOL/L — SIGNIFICANT CHANGE UP (ref 3.5–5.3)
POTASSIUM SERPL-SCNC: 4.3 MMOL/L — SIGNIFICANT CHANGE UP (ref 3.5–5.3)
PROTHROM AB SERPL-ACNC: 31.8 SEC — HIGH (ref 10–12.9)
RBC # BLD: 4.82 M/UL — SIGNIFICANT CHANGE UP (ref 4.2–5.8)
RBC # FLD: 13.6 % — SIGNIFICANT CHANGE UP (ref 10.3–14.5)
SODIUM SERPL-SCNC: 143 MMOL/L — SIGNIFICANT CHANGE UP (ref 135–145)
WBC # BLD: 9.86 K/UL — SIGNIFICANT CHANGE UP (ref 3.8–10.5)
WBC # FLD AUTO: 9.86 K/UL — SIGNIFICANT CHANGE UP (ref 3.8–10.5)

## 2019-10-12 PROCEDURE — 93010 ELECTROCARDIOGRAM REPORT: CPT

## 2019-10-12 PROCEDURE — 99233 SBSQ HOSP IP/OBS HIGH 50: CPT

## 2019-10-12 PROCEDURE — 99232 SBSQ HOSP IP/OBS MODERATE 35: CPT

## 2019-10-12 RX ORDER — OXCARBAZEPINE 300 MG/1
600 TABLET, FILM COATED ORAL
Refills: 0 | Status: DISCONTINUED | OUTPATIENT
Start: 2019-10-12 | End: 2019-10-14

## 2019-10-12 RX ADMIN — LEVETIRACETAM 400 MILLIGRAM(S): 250 TABLET, FILM COATED ORAL at 05:31

## 2019-10-12 RX ADMIN — OXCARBAZEPINE 600 MILLIGRAM(S): 300 TABLET, FILM COATED ORAL at 18:37

## 2019-10-12 RX ADMIN — LEVETIRACETAM 1000 MILLIGRAM(S): 250 TABLET, FILM COATED ORAL at 00:04

## 2019-10-12 RX ADMIN — LEVETIRACETAM 400 MILLIGRAM(S): 250 TABLET, FILM COATED ORAL at 18:37

## 2019-10-12 RX ADMIN — ENOXAPARIN SODIUM 40 MILLIGRAM(S): 100 INJECTION SUBCUTANEOUS at 12:13

## 2019-10-12 NOTE — ED ADULT NURSE REASSESSMENT NOTE - NS ED NURSE REASSESS COMMENT FT1
Pt back safely from ED CT scan. Placed back on cardiac monitor. Call bell within reach. Arousable to voice but lethargic. Drooling blood from mouth, washed. Awaiting results.

## 2019-10-12 NOTE — ED ADULT NURSE REASSESSMENT NOTE - NS ED NURSE REASSESS COMMENT FT1
pt. a&ox3. pt. denies pain or discomfort at this time. NSR on cm. pt. in no apparent distress at this time, breathing even and unlabored. no active bleeding at this time. awaiting bed. informed on plan of care.

## 2019-10-12 NOTE — PROGRESS NOTE ADULT - SUBJECTIVE AND OBJECTIVE BOX
[facility]                                        Neurology at [place]                                 Mookie Nunes, & Isidro                                  [address]                                        [place], [place], [address]                                             [phone]        CC:     HPI:   The patient is a 44y Male who presented to [facility] after a seizure.   He has a history of head injury with depressed skull fracture with surgery and a metal plate in the right frontotemporal region.   He has a history of seizure in the past but had reportedly been seizure free for about 8 years. He follows with a neurologist in [place].  He apparently admitted to changing sleep patterns as his only risk factor.   He is reportedly on Trileptal although the dose is unclear.   At [facility] there was concern for subdural hematoma and he was transferred here.     Interim history:  Remains in ER awaiting bed.  Now more awake.     Family history negative for seizure.    Social history.  Non smoker.  Lives in [place] and works in [employer]     ROS:   Denies headache or dizziness.  Denies chest pain.  Denies shortness of breath.    MEDICATIONS  (STANDING):  enoxaparin Injectable 40 milliGRAM(s) SubCutaneous daily  levETIRAcetam  IVPB 750 milliGRAM(s) IV Intermittent every 12 hours      Vital Signs Last 24 Hrs  T(C): 37.1 (12 Oct 2019 07:38), Max: 37.2 (12 Oct 2019 00:01)  T(F): 98.8 (12 Oct 2019 07:38), Max: 99 (12 Oct 2019 00:01)  HR: 81 (12 Oct 2019 07:38) (75 - 120)  BP: 108/62 (12 Oct 2019 07:38) (106/57 - 168/72)  BP(mean): 80 (12 Oct 2019 07:38) (75 - 80)  RR: 18 (12 Oct 2019 07:38) (18 - 26)  SpO2: 97% (12 Oct 2019 07:38) (96% - 100%)    Detailed Neurologic Exam:    Mental status: The patient is awake and alert. There is no aphasia. There is moderate dysarthria.     Cranial nerves: Pupils equal and react symmetrically to light. There is no visual field deficit to threat. Extraocular motion is full with no nystagmus. There is no ptosis. Facial sensation is intact. Facial musculature is symmetric. Palate elevates symmetrically. Tongue is midline.    Motor: There is normal bulk and tone.  There is no tremor.  Strength grossly 5/5 bilaterally.    Sensation: Grossly intact to light touch and pin.    Reflexes: 2+ throughout and plantar responses are flexor.    Cerebellar: No dysmetria on finger nose testing.    Labs:     10-12    143  |  109<H>  |  12.0  ----------------------------<  116<H>  4.3   |  20.0<L>  |  0.65    Ca    8.8      12 Oct 2019 07:15    TPro  6.5<L>  /  Alb  3.8  /  TBili  0.2<L>  /  DBili  x   /  AST  24  /  ALT  33  /  AlkPhos  80  10-11                            14.6   9.86  )-----------( 230      ( 12 Oct 2019 07:15 )             44.4

## 2019-10-12 NOTE — ED ADULT NURSE REASSESSMENT NOTE - NS ED NURSE REASSESS COMMENT FT1
PT in no acute distress, breathing is even and unlabored on room air. Pt noted to have dry blood from tongue bleed. Will continue to follow POC.

## 2019-10-12 NOTE — PROGRESS NOTE ADULT - ASSESSMENT
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 Change to PO once awake. Resume Trileptal 600 mg BID. per neurology

## 2019-10-12 NOTE — PROGRESS NOTE ADULT - ASSESSMENT
44y Male with breakthrough seizure.  He has known structural brain pathology and seizure disorder.     Seizure  Agree with maintaining on Keppra 750 mg BID.   Change to PO once awake.   Resume Trileptal 600 mg BID.    Patient advised that in [facility] he is not cleared to drive.

## 2019-10-12 NOTE — PROGRESS NOTE ADULT - SUBJECTIVE AND OBJECTIVE BOX
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 Patient states his son found him this AM covered in blood and called EMS. They obtained a CT head at [facility] which suggested a SDH and he was transferred to [facility] for evaluation.   Patient without complaints at time of assessment, just states he feels "uncomfortable." States he had transient LLE numbness earlier that has since resolved. States he wants to "get the show on the road so he can get out of here." Denies headache, dizziness, nausea, vomiting    Repeat CT head at [facility] without evidence of SDH, most likely motion artifact. While in ER at [facility] has seizure and got 2mg IV ativan and 4mg Versed, loaded with 1g of keppra. During seizure he did bite his tongue. Significant amount of bleeding during the episode which required 10min of suctioning.    He has a history of some type of brain injury with surgery and a metal plate in the right frontotemporal region.   He has a history of seizure in the past but had reportedly been seizure free for about 8 years.   He apparently admitted to changing sleep patterns as his only risk factor.     SH- works for a [employer] in [place], lives in [place], denies habits  FH- unknown per pt (11 Oct 2019 17:03)    The patient was seen and evaluated   The patient is in no acute distress.  Denied any fever chest pain, palpitations, shortness of breath, abdominal pain, fever      I&O's Summary    Allergies    Dilantin (Anaphylaxis; Hives)  valproic acid (Anaphylaxis; Hives)    Intolerances      HEALTH ISSUES - PROBLEM Dx:        PAST MEDICAL & SURGICAL HISTORY:  S/P craniotomy  TBI (traumatic brain injury)  Seizure  Factor 5 Leiden mutation, heterozygous          Vital Signs Last 24 Hrs  T(C): 37.1 (12 Oct 2019 07:38), Max: 37.2 (12 Oct 2019 00:01)  T(F): 98.8 (12 Oct 2019 07:38), Max: 99 (12 Oct 2019 00:01)  HR: 78 (12 Oct 2019 12:10) (75 - 120)  BP: 105/54 (12 Oct 2019 12:10) (105/54 - 168/72)  BP(mean): 80 (12 Oct 2019 07:38) (75 - 80)  RR: 18 (12 Oct 2019 07:38) (18 - 26)  SpO2: 97% (12 Oct 2019 07:38) (96% - 100%)T(C): 37.1 (10-12-19 @ 07:38), Max: 37.2 (10-12-19 @ 00:01)  HR: 78 (10-12-19 @ 12:10) (75 - 120)  BP: 105/54 (10-12-19 @ 12:10) (105/54 - 168/72)  RR: 18 (10-12-19 @ 07:38) (18 - 26)  SpO2: 97% (10-12-19 @ 07:38) (96% - 100%)  Wt(kg): --    PHYSICAL EXAM:    GENERAL: NAD, positive dysarthria  HEAD:  Atraumatic, Normocephalic  EYES: EOMI, PERRL  NERVOUS SYSTEM:  sleepy when awake, Moves upper and lower extremities; CNS-II-XII  CHEST/LUNG: Clear to auscultation bilaterally; No rales, rhonchi, wheezing,   HEART: Regular rate and rhythm; No murmurs,   ABDOMEN: Soft, Nontender, Nondistended; Bowel sounds present  EXTREMITIES:  Peripheral Pulses, No  cyanosis, or edema      enoxaparin Injectable 40 milliGRAM(s) SubCutaneous daily  levETIRAcetam  IVPB 750 milliGRAM(s) IV Intermittent every 12 hours  OXcarbazepine 600 milliGRAM(s) Oral two times a day      LABS:                          14.6   9.86  )-----------( 230      ( 12 Oct 2019 07:15 )             44.4     10-12    143  |  109<H>  |  12.0  ----------------------------<  116<H>  4.3   |  20.0<L>  |  0.65    Ca    8.8      12 Oct 2019 07:15    TPro  6.5<L>  /  Alb  3.8  /  TBili  0.2<L>  /  DBili  x   /  AST  24  /  ALT  33  /  AlkPhos  80  10-11    LIVER FUNCTIONS - ( 11 Oct 2019 11:37 )  Alb: 3.8 g/dL / Pro: 6.5 g/dL / ALK PHOS: 80 U/L / ALT: 33 U/L / AST: 24 U/L / GGT: x           PT/INR - ( 12 Oct 2019 07:15 )   PT: 31.8 sec;   INR: 2.68 ratio         PTT - ( 11 Oct 2019 11:37 )  PTT:49.0 sec      Urinalysis Basic - ( 11 Oct 2019 18:18 )    Color: Yellow / Appearance: Clear / S.025 / pH: x  Gluc: x / Ketone: Negative  / Bili: Negative / Urobili: Negative mg/dL   Blood: x / Protein: 15 mg/dL / Nitrite: Negative   Leuk Esterase: Negative / RBC: 0-2 /HPF / WBC 0-2   Sq Epi: x / Non Sq Epi: Occasional / Bacteria: Occasional      CAPILLARY BLOOD GLUCOSE          RADIOLOGY & ADDITIONAL TESTS:      Consultant notes reviewed    Case discussed with consultant/provider/ family /patient

## 2019-10-12 NOTE — ED ADULT NURSE REASSESSMENT NOTE - NS ED NURSE REASSESS COMMENT FT1
Pt received from [facility] galindo sleeping in bed. Pt has visible blood in his mouth from seizure earlier in the day, pt bite his tongue. Pt cleaned, gown and sheets changed, repositioned for comfort. Pt on cardiac monitor, vitals taken. Pt states verbal understanding of plan of care - to be admitted to 4 BRK. Awaiting room assignment. Hourly rounding to ensure pt safety. pt on 3 L NC. Call bell within reach.

## 2019-10-12 NOTE — ED ADULT NURSE REASSESSMENT NOTE - NS ED NURSE REASSESS COMMENT FT1
Pt arousable to voice at this time, states he's tongue feels sore. Pt still drooling while sleeping, bloody. Pt cleaned and repositioned for comfort. Pt on cardiac monitor and 3 L NC. Keppra infusing. Awaiting 4 BRK bed assignemnet at this time. Suction at bedside in case needed.

## 2019-10-12 NOTE — ED ADULT NURSE REASSESSMENT NOTE - NS ED NURSE REASSESS COMMENT FT1
pt. received from night RN. pt. sleeping, easy to arouse. pt. a&ox3. pt. has hx of seizures. pt. states he had a seizure. pt. is compliant with his medications.  pt. does not really remembers what happened states he was unconscious. pt. does not believe he hit his head, pt. states he bit his tongue. blood noted to tongue. pt. denies pain or discomfort at this time. breathing even and unlabored. pt. sating well on 2LNC. pt. received from night RN. pt. sleeping, easy to arouse. pt. a&ox3. pt. has hx of seizures. pt. states he had a seizure. pt. is compliant with his medications. pt. does not really remembers what happened states he was unconscious. pt. does not believe he hit his head, pt. states he bit his tongue. blood noted to tongue. pt. denies pain or discomfort at this time. breathing even and unlabored. pt. sating well on 2LNC. pt. received from night RN. pt. sleeping, easy to arouse. pt. a&ox3. pt. has hx of seizures. pt. states he had a seizure. pt. is compliant with his medications. pt. does not really remembers what happened states he was unconscious. pt. does not believe he hit his head, pt. states he bit his tongue. blood noted to tongue. pt. denies pain or discomfort at this time. breathing even and unlabored. pt. sating well on 4LNC.

## 2019-10-13 LAB
INR BLD: 1.58 RATIO — HIGH (ref 0.88–1.16)
PROTHROM AB SERPL-ACNC: 18.4 SEC — HIGH (ref 10–12.9)

## 2019-10-13 PROCEDURE — 99232 SBSQ HOSP IP/OBS MODERATE 35: CPT

## 2019-10-13 PROCEDURE — 99233 SBSQ HOSP IP/OBS HIGH 50: CPT

## 2019-10-13 RX ORDER — OXCARBAZEPINE 300 MG/1
1 TABLET, FILM COATED ORAL
Qty: 60 | Refills: 0
Start: 2019-10-13 | End: 2019-11-11

## 2019-10-13 RX ORDER — WARFARIN SODIUM 2.5 MG/1
9 TABLET ORAL ONCE
Refills: 0 | Status: COMPLETED | OUTPATIENT
Start: 2019-10-13 | End: 2019-10-13

## 2019-10-13 RX ORDER — LEVETIRACETAM 250 MG/1
1 TABLET, FILM COATED ORAL
Qty: 60 | Refills: 0
Start: 2019-10-13 | End: 2019-11-11

## 2019-10-13 RX ORDER — LEVETIRACETAM 250 MG/1
750 TABLET, FILM COATED ORAL
Refills: 0 | Status: DISCONTINUED | OUTPATIENT
Start: 2019-10-13 | End: 2019-10-14

## 2019-10-13 RX ORDER — INFLUENZA VIRUS VACCINE 15; 15; 15; 15 UG/.5ML; UG/.5ML; UG/.5ML; UG/.5ML
0.5 SUSPENSION INTRAMUSCULAR ONCE
Refills: 0 | Status: COMPLETED | OUTPATIENT
Start: 2019-10-13 | End: 2019-10-13

## 2019-10-13 RX ORDER — WARFARIN SODIUM 2.5 MG/1
3 TABLET ORAL
Qty: 0 | Refills: 0 | DISCHARGE
Start: 2019-10-13

## 2019-10-13 RX ADMIN — WARFARIN SODIUM 9 MILLIGRAM(S): 2.5 TABLET ORAL at 21:43

## 2019-10-13 RX ADMIN — OXCARBAZEPINE 600 MILLIGRAM(S): 300 TABLET, FILM COATED ORAL at 17:24

## 2019-10-13 RX ADMIN — ENOXAPARIN SODIUM 40 MILLIGRAM(S): 100 INJECTION SUBCUTANEOUS at 21:43

## 2019-10-13 RX ADMIN — LEVETIRACETAM 400 MILLIGRAM(S): 250 TABLET, FILM COATED ORAL at 06:02

## 2019-10-13 RX ADMIN — LEVETIRACETAM 750 MILLIGRAM(S): 250 TABLET, FILM COATED ORAL at 17:24

## 2019-10-13 RX ADMIN — OXCARBAZEPINE 600 MILLIGRAM(S): 300 TABLET, FILM COATED ORAL at 06:02

## 2019-10-13 NOTE — PROGRESS NOTE ADULT - SUBJECTIVE AND OBJECTIVE BOX
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 Patient states his son found him this AM covered in blood and called EMS. They obtained a CT head at [facility] which suggested a SDH and he was transferred to [facility] for evaluation.   Patient without complaints at time of assessment, just states he feels "uncomfortable." States he had transient LLE numbness earlier that has since resolved. States he wants to "get the show on the road so he can get out of here." Denies headache, dizziness, nausea, vomiting    Repeat CT head at [facility] without evidence of SDH, most likely motion artifact. While in ER at [facility] has seizure and got 2mg IV ativan and 4mg Versed, loaded with 1g of keppra. During seizure he did bite his tongue. Significant amount of bleeding during the episode which required 10min of suctioning.    He has a history of some type of brain injury with surgery and a metal plate in the right frontotemporal region.   He has a history of seizure in the past but had reportedly been seizure free for about 8 years.   He apparently admitted to changing sleep patterns as his only risk factor.     SH- works for a [employer] in [place], lives in [place], denies habits  FH- unknown per pt (11 Oct 2019 17:03)    The patient was seen and evaluated   The patient is in no acute distress.  Denied any fever chest pain, palpitations, shortness of breath, abdominal pain, fever, dysuria, cough, edema       I&O's Summary    13 Oct 2019 07:01  -  13 Oct 2019 16:52  --------------------------------------------------------  IN: 0 mL / OUT: 400 mL / NET: -400 mL      Allergies    Dilantin (Anaphylaxis; Hives)  valproic acid (Anaphylaxis; Hives)    Intolerances      HEALTH ISSUES - PROBLEM Dx:        PAST MEDICAL & SURGICAL HISTORY:  S/P craniotomy  TBI (traumatic brain injury)  Seizure  Factor 5 Leiden mutation, heterozygous          Vital Signs Last 24 Hrs  T(C): 36.4 (13 Oct 2019 15:35), Max: 37 (13 Oct 2019 01:21)  T(F): 97.6 (13 Oct 2019 15:35), Max: 98.6 (13 Oct 2019 01:21)  HR: 73 (13 Oct 2019 15:35) (72 - 82)  BP: 112/82 (13 Oct 2019 15:35) (109/72 - 134/83)  BP(mean): --  RR: 18 (13 Oct 2019 15:35) (17 - 18)  SpO2: 95% (13 Oct 2019 15:35) (94% - 97%)T(C): 36.4 (10-13-19 @ 15:35), Max: 37 (10-13-19 @ 01:21)  HR: 73 (10-13-19 @ 15:35) (72 - 82)  BP: 112/82 (10-13-19 @ 15:35) (109/72 - 134/83)  RR: 18 (10-13-19 @ 15:35) (17 - 18)  SpO2: 95% (10-13-19 @ 15:35) (94% - 97%)  Wt(kg): --    PHYSICAL EXAM:    GENERAL: NAD,  HEAD:  Atraumatic, Normocephalic  NERVOUS SYSTEM:  Alert & Oriented X3,  Moves upper and lower extremities; CNS-II-XII  CHEST/LUNG: Clear to auscultation bilaterally; No rales, rhonchi, wheezing,   HEART: Regular rate and rhythm; No murmurs,   ABDOMEN: Soft, Nontender, Nondistended; Bowel sounds present  EXTREMITIES:  Peripheral Pulses, No  cyanosis, or edema  SKIN: No rashes or lesions  psychiatry- mood and affect approprite, Insight and judgement intact     enoxaparin Injectable 40 milliGRAM(s) SubCutaneous daily  influenza   Vaccine 0.5 milliLiter(s) IntraMuscular once  levETIRAcetam 750 milliGRAM(s) Oral two times a day  OXcarbazepine 600 milliGRAM(s) Oral two times a day  warfarin 9 milliGRAM(s) Oral once      LABS:                          14.6   9.86  )-----------( 230      ( 12 Oct 2019 07:15 )             44.4     10-    143  |  109<H>  |  12.0  ----------------------------<  116<H>  4.3   |  20.0<L>  |  0.65    Ca    8.8      12 Oct 2019 07:15        PT/INR - ( 13 Oct 2019 05:53 )   PT: 18.4 sec;   INR: 1.58 ratio               Urinalysis Basic - ( 11 Oct 2019 18:18 )    Color: Yellow / Appearance: Clear / S.025 / pH: x  Gluc: x / Ketone: Negative  / Bili: Negative / Urobili: Negative mg/dL   Blood: x / Protein: 15 mg/dL / Nitrite: Negative   Leuk Esterase: Negative / RBC: 0-2 /HPF / WBC 0-2   Sq Epi: x / Non Sq Epi: Occasional / Bacteria: Occasional      CAPILLARY BLOOD GLUCOSE          RADIOLOGY & ADDITIONAL TESTS:      Consultant notes reviewed    Case discussed with consultant/provider/ family /patient

## 2019-10-13 NOTE — PROGRESS NOTE ADULT - ASSESSMENT
44 yr old Male with PMH TBI with depressed skull fx s/p craniotomy for revision 36 years ago in [place], seizures last known seizure 8 years ago on trileptal 600 mg BID followed by neurologist in [place], Factor V Leiden on Coumadin, [place] [facility] [place] concerns for possible [place]H- for now cont  Keppra 750 mg BID. Change to PO once awake. Resume Trileptal 600 mg BID. per neurology  patient needs arrangements made prior to discharge

## 2019-10-13 NOTE — PROGRESS NOTE ADULT - SUBJECTIVE AND OBJECTIVE BOX
[facility]                                        Neurology at [place]                                 Mookie Nunes, & Isidro                                  [address]                                        [place], [place], [address]                                             [phone]        CC: seizure     HPI:   The patient is a 44y Male who presented to [facility] after a seizure.   He has a history of head injury with depressed skull fracture with surgery and a metal plate in the right frontotemporal region.   He has a history of seizure in the past but had reportedly been seizure free for about 8 years. He follows with a neurologist in [place].  He apparently admitted to changing sleep patterns as his only risk factor.   He is reportedly on Trileptal although the dose is unclear.   At [facility] there was concern for subdural hematoma and he was transferred here.     Interim history:  Now on 4 [place].   No further seizures.     ROS:   Denies headache or dizziness.  Denies chest pain.  Denies shortness of breath.    MEDICATIONS  (STANDING):  enoxaparin Injectable 40 milliGRAM(s) SubCutaneous daily  influenza   Vaccine 0.5 milliLiter(s) IntraMuscular once  levETIRAcetam  IVPB 750 milliGRAM(s) IV Intermittent every 12 hours  OXcarbazepine 600 milliGRAM(s) Oral two times a day      Vital Signs Last 24 Hrs  T(C): 36.9 (13 Oct 2019 05:55), Max: 37 (13 Oct 2019 01:21)  T(F): 98.4 (13 Oct 2019 05:55), Max: 98.6 (13 Oct 2019 01:21)  HR: 75 (13 Oct 2019 05:55) (75 - 82)  BP: 124/66 (13 Oct 2019 05:55) (105/54 - 134/83)  RR: 18 (13 Oct 2019 05:55) (18 - 18)  SpO2: 96% (13 Oct 2019 05:55) (94% - 98%)    Detailed Neurologic Exam:    Mental status: The patient is awake and alert. There is no aphasia. There is mild dysarthria.     Cranial nerves: Pupils equal and react symmetrically to light. There is no visual field deficit to threat. Extraocular motion is full with no nystagmus. There is no ptosis. Facial sensation is intact. Facial musculature is symmetric. Palate elevates symmetrically. Tongue is midline.    Motor: There is normal bulk and tone.  There is no tremor.  Strength grossly 5/5 bilaterally.    Sensation: Grossly intact to light touch and pin.    Reflexes: 2+ throughout and plantar responses are flexor.    Cerebellar: No dysmetria on finger nose testing.    Labs:     10-12    143  |  109<H>  |  12.0  ----------------------------<  116<H>  4.3   |  20.0<L>  |  0.65    Ca    8.8      12 Oct 2019 07:15    TPro  6.5<L>  /  Alb  3.8  /  TBili  0.2<L>  /  DBili  x   /  AST  24  /  ALT  33  /  AlkPhos  80  10-11                            14.6   9.86  )-----------( 230      ( 12 Oct 2019 07:15 )             44.4

## 2019-10-13 NOTE — PROGRESS NOTE ADULT - ASSESSMENT
44y Male with breakthrough seizure.  He has known structural brain pathology and seizure disorder.     Seizure  Agree with maintaining on Keppra 750 mg BID.   Will change to PO.   Continue Trileptal 600 mg BID.    Factor V Leiden   Continue Coumadin.     Patient advised that in [place] he is not cleared to drive.

## 2019-10-14 ENCOUNTER — TRANSCRIPTION ENCOUNTER (OUTPATIENT)
Age: 44
End: 2019-10-14

## 2019-10-14 VITALS
RESPIRATION RATE: 18 BRPM | HEART RATE: 77 BPM | OXYGEN SATURATION: 95 % | SYSTOLIC BLOOD PRESSURE: 126 MMHG | DIASTOLIC BLOOD PRESSURE: 77 MMHG | TEMPERATURE: 98 F

## 2019-10-14 PROBLEM — Z00.00 ENCOUNTER FOR PREVENTIVE HEALTH EXAMINATION: Status: ACTIVE | Noted: 2019-10-14

## 2019-10-14 LAB
INR BLD: 1.3 RATIO — HIGH (ref 0.88–1.16)
PROTHROM AB SERPL-ACNC: 15.1 SEC — HIGH (ref 10–12.9)

## 2019-10-14 PROCEDURE — 36415 COLL VENOUS BLD VENIPUNCTURE: CPT

## 2019-10-14 PROCEDURE — 85730 THROMBOPLASTIN TIME PARTIAL: CPT

## 2019-10-14 PROCEDURE — 85610 PROTHROMBIN TIME: CPT

## 2019-10-14 PROCEDURE — 86901 BLOOD TYPING SEROLOGIC RH(D): CPT

## 2019-10-14 PROCEDURE — 86850 RBC ANTIBODY SCREEN: CPT

## 2019-10-14 PROCEDURE — 80053 COMPREHEN METABOLIC PANEL: CPT

## 2019-10-14 PROCEDURE — 80048 BASIC METABOLIC PNL TOTAL CA: CPT

## 2019-10-14 PROCEDURE — 81001 URINALYSIS AUTO W/SCOPE: CPT

## 2019-10-14 PROCEDURE — 99285 EMERGENCY DEPT VISIT HI MDM: CPT | Mod: 25

## 2019-10-14 PROCEDURE — 80307 DRUG TEST PRSMV CHEM ANLYZR: CPT

## 2019-10-14 PROCEDURE — 99232 SBSQ HOSP IP/OBS MODERATE 35: CPT

## 2019-10-14 PROCEDURE — 96375 TX/PRO/DX INJ NEW DRUG ADDON: CPT

## 2019-10-14 PROCEDURE — 85027 COMPLETE CBC AUTOMATED: CPT

## 2019-10-14 PROCEDURE — 99239 HOSP IP/OBS DSCHRG MGMT >30: CPT

## 2019-10-14 PROCEDURE — 86900 BLOOD TYPING SEROLOGIC ABO: CPT

## 2019-10-14 PROCEDURE — 70450 CT HEAD/BRAIN W/O DYE: CPT

## 2019-10-14 PROCEDURE — 96374 THER/PROPH/DIAG INJ IV PUSH: CPT

## 2019-10-14 RX ORDER — WARFARIN SODIUM 2.5 MG/1
10 TABLET ORAL ONCE
Refills: 0 | Status: COMPLETED | OUTPATIENT
Start: 2019-10-14 | End: 2019-10-14

## 2019-10-14 RX ADMIN — LEVETIRACETAM 750 MILLIGRAM(S): 250 TABLET, FILM COATED ORAL at 05:39

## 2019-10-14 RX ADMIN — WARFARIN SODIUM 10 MILLIGRAM(S): 2.5 TABLET ORAL at 10:30

## 2019-10-14 RX ADMIN — OXCARBAZEPINE 600 MILLIGRAM(S): 300 TABLET, FILM COATED ORAL at 05:39

## 2019-10-14 NOTE — PROGRESS NOTE ADULT - SUBJECTIVE AND OBJECTIVE BOX
[facility]                                        Neurology at [place]                                 Mookie Nunes, & Isidro                                  [address]                                        [place], [place], [address]                                             [phone]        CC: seizure     HPI:   The patient is a 44y Male who presented to [facility] after a seizure.   He has a history of head injury with depressed skull fracture with surgery and a metal plate in the right frontotemporal region.   He has a history of seizure in the past but had reportedly been seizure free for about 8 years. He follows with a neurologist in [place].  He apparently admitted to changing sleep patterns as his only risk factor.   He is reportedly on Trileptal although the dose is unclear.   At [facility] there was concern for subdural hematoma and he was transferred here.     Interim history:  Now on 4 [place].   No further seizures.     ROS:   Denies headache or dizziness.  Denies chest pain.  Denies shortness of breath.    MEDICATIONS  (STANDING):  enoxaparin Injectable 40 milliGRAM(s) SubCutaneous daily  influenza   Vaccine 0.5 milliLiter(s) IntraMuscular once  levETIRAcetam  IVPB 750 milliGRAM(s) IV Intermittent every 12 hours  OXcarbazepine 600 milliGRAM(s) Oral two times a day      Vital Signs Last 24 Hrs  T(C): 36.9 (13 Oct 2019 05:55), Max: 37 (13 Oct 2019 01:21)  T(F): 98.4 (13 Oct 2019 05:55), Max: 98.6 (13 Oct 2019 01:21)  HR: 75 (13 Oct 2019 05:55) (75 - 82)  BP: 124/66 (13 Oct 2019 05:55) (105/54 - 134/83)  RR: 18 (13 Oct 2019 05:55) (18 - 18)  SpO2: 96% (13 Oct 2019 05:55) (94% - 98%)    Detailed Neurologic Exam:    Mental status: The patient is awake and alert. There is no aphasia. There is mild dysarthria.     Cranial nerves: Pupils equal and react symmetrically to light. There is no visual field deficit to threat. Extraocular motion is full with no nystagmus. There is no ptosis. Facial sensation is intact. Facial musculature is symmetric. Palate elevates symmetrically. Tongue is midline.    Motor: There is normal bulk and tone.  There is no tremor.  Strength grossly 5/5 bilaterally.    Sensation: Grossly intact to light touch and pin.    Reflexes: 2+ throughout and plantar responses are flexor.    Cerebellar: No dysmetria on finger nose testing.    Labs:     10-12    143  |  109<H>  |  12.0  ----------------------------<  116<H>  4.3   |  20.0<L>  |  0.65    Ca    8.8      12 Oct 2019 07:15    TPro  6.5<L>  /  Alb  3.8  /  TBili  0.2<L>  /  DBili  x   /  AST  24  /  ALT  33  /  AlkPhos  80  10-11                            14.6   9.86  )-----------( 230      ( 12 Oct 2019 07:15 )             44.4                 Assessment and Plan:   · Assessment		  44y Male with breakthrough seizure.  He has known structural brain pathology and seizure disorder.     Seizure  Agree with maintaining on Keppra 750 mg BID.   Continue Trileptal 600 mg BID.    Factor V Leiden   Continue Coumadin.     Patient advised that in [facility] he is not cleared to drive.

## 2019-10-14 NOTE — DISCHARGE NOTE NURSING/CASE MANAGEMENT/SOCIAL WORK - PATIENT PORTAL LINK FT
You can access the FollowMyHealth Patient Portal offered by [facility] by registering at the following website: [email or web address]. By joining [employer]’s FollowMyHealth portal, you will also be able to view your health information using other applications (apps) compatible with our system.

## 2019-10-14 NOTE — DISCHARGE NOTE PROVIDER - HOSPITAL COURSE
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 Change to PO once awake. Resume Trileptal 600 mg BID. per neurology    patient needs arrangements made prior to discharge             GENERAL: NAD, well-groomed, well-developed    HEAD:  Atraumatic, Normocephalic    EYES: EOMI,  conjunctiva and sclera clear    ENMT: No tonsillar erythema, exudates, or enlargement; Moist mucous membranes, Good dentition, No lesions    NERVOUS SYSTEM:  Alert & Oriented X3, Good concentration; Motor Strength 5/5 B/L upper and lower extremities; DTRs 2+ intact and symmetric    CHEST/LUNG: Clear to percussion bilaterally; No rales, rhonchi, wheezing, or rubs    HEART: Regular rate and rhythm; No murmurs, rubs, or gallops    ABDOMEN: Soft, Nontender, Nondistended; Bowel sounds present    EXTREMITIES:  2+ Peripheral Pulses, No clubbing, cyanosis, or edema    SKIN: No rashes or lesions

## 2019-10-14 NOTE — DISCHARGE NOTE PROVIDER - NSDCCPCAREPLAN_GEN_ALL_CORE_FT
PRINCIPAL DISCHARGE DIAGNOSIS  Diagnosis: Status epilepticus, generalized convulsive  Assessment and Plan of Treatment: medications adjusted

## 2019-10-14 NOTE — DISCHARGE NOTE PROVIDER - CARE PROVIDER_API CALL
Jomar Cronin)  Neurology; Vascular Neurology  [address] [place] [address]  Phone: [phone]  Fax: [phone]  Follow Up Time:

## 2020-01-01 NOTE — ED PROVIDER NOTE - CLINICAL SUMMARY MEDICAL DECISION MAKING FREE TEXT BOX
Hypoglycemia/DMT2 patient arrived s/p seizure and possible sdh, repeat ct with no acute bleed, persistent seizures and post-ictal state, will admit to ICU for further care. neuro at bedside for evaluation.